# Patient Record
Sex: MALE | Race: WHITE | NOT HISPANIC OR LATINO | Employment: FULL TIME | URBAN - METROPOLITAN AREA
[De-identification: names, ages, dates, MRNs, and addresses within clinical notes are randomized per-mention and may not be internally consistent; named-entity substitution may affect disease eponyms.]

---

## 2019-11-29 ENCOUNTER — CLINICAL SUPPORT (OUTPATIENT)
Dept: GASTROENTEROLOGY | Facility: CLINIC | Age: 59
End: 2019-11-29

## 2019-11-29 VITALS — HEIGHT: 71 IN | WEIGHT: 221 LBS | BODY MASS INDEX: 30.94 KG/M2

## 2019-11-29 DIAGNOSIS — Z12.11 ENCOUNTER FOR SCREENING COLONOSCOPY: Primary | ICD-10-CM

## 2019-11-29 RX ORDER — LISINOPRIL 10 MG/1
10 TABLET ORAL DAILY
COMMUNITY

## 2019-11-29 NOTE — PROGRESS NOTES
Pt here for colon prep  Dx: screening  Rx sent to provider for signature  Instructions for Plenvu given  Meds reviewed  Screening form signed

## 2025-02-13 NOTE — PROGRESS NOTES
Patient ID: Damien Bello is a 64 y.o. male Date of Birth 1960       Chief Complaint   Patient presents with    New Patient Visit           Heel Pain     Left                Diagnosis:  1. Plantar fasciitis, left  -     bupivacaine (MARCAINE) 0.5 % injection 0.5 mL  -     dexamethasone (DECADRON) injection 4 mg  -     triamcinolone acetonide (KENALOG-40) 40 mg/mL injection 40 mg  -     Ambulatory Referral to Physical Therapy; Future  -     Device Prior Authorization; Future    Initial pedal evaluation with socks and shoes removed.  Discussed biomechanics, etiology and treatment options for heel pain.   Patient given stretching exercises to do at home, ice, no barefoot.  Physical therapy was ordered.  Injection administered, please see procedure note.  Patient is good feet store orthotics were evaluated, they do not provide an appropriate biomechanical support especially in the rear foot, we will preop custom orthotics, he is aware there will be a $350 charge if they are not covered, he is willing to proceed, patient is scheduled for casting appointment.  We discussed shoe gear modification, he will go to [employer] for new shoes.  Patient understands and agrees with plan will follow-up in 6 to 8 weeks.      Foot/lower extremity injection    Performed by: Ellyn Almeida DPM  Authorized by: Ellyn Almeida DPM    Procedure:     Other Assisting Provider: No      Verbal consent obtained?: Yes      Risks and benefits: Risks, benefits and alternatives were discussed      Consent given by:  Patient    Time out: Immediately prior to the procedure a time out was called      Patient states understanding of procedure being performed: Yes      Patient identity confirmed:  Verbally with patient    Supporting Documentation:     Indications:  Pain    Procedure Details:    Prep: patient was prepped and draped in usual sterile fashion                Ethyl Chloride was applied      Needle size: 25 G G     Ultrasound Guidance: no      Approach:  Medial    Laterality:  Left    Location: aponeurosis      Aponeurosis Structures: Plantar fascia origin      Comments:      1:1:1 mixture of 0.5 cc each of 0.5% plain Marcaine, Decadron 4 mg/mL and Kenalog 40 was injected to medial left heel       Subjective:   Obdulio presents today for evaluation and care of left heel pain, he states he has always had foot issues, he had special shoes when he was younger, when he lived in [place] he got a pair of custom orthotics which lasted him about 30 years, subsequently [place] and has been doing the routine and been doing okay and then last year when he went to [place] when he was not wearing the orthotics he started with heel pain, now pain at worst is 7/10.        The following portions of the patient's history were reviewed and updated as appropriate:     Past Medical History:   Diagnosis Date    Hypertension 2022    Plantar fasciitis 1991       Past Surgical History:   Procedure Laterality Date    KNEE SURGERY  2008    Left knee, torn meniscus repair    TONSILLECTOMY  1964       Social History     Socioeconomic History    Marital status: /Civil Union     Spouse name: None    Number of children: None    Years of education: None    Highest education level: None   Occupational History    None   Tobacco Use    Smoking status: Never    Smokeless tobacco: Never    Tobacco comments:     Can't quit what you never started  ;-)   Substance and Sexual Activity    Alcohol use: Not Currently    Drug use: Never    Sexual activity: Yes     Partners: Female     Birth control/protection: None   Other Topics Concern    None   Social History Narrative    None     Social Drivers of Health     Financial Resource Strain: Not At Risk (12/12/2024)    Received from [facility]    Financial Resource Strain     In the last 12 months did you skip medications to save money?: No     In the  last 12 months, was there a time when you needed to see a doctor but could not because of cost?: No   Food Insecurity: Not At Risk (12/12/2024)    Received from [facility]    Food Insecurity     In the last 12 months did you ever eat less than you felt you should because there wasn't enough money for food?: No   Transportation Needs: Not At Risk (12/12/2024)    Received from [facility]    Transporation     In the last 12 months, have you ever had to go without healthcare because you didn't have a way to get there?: No   Physical Activity: Sufficiently Active (12/12/2024)    Received from [facility]    Exercise Vital Sign     Days of Exercise per Week: 7 days     Minutes of Exercise per Session: 70 min   Stress: No Stress Concern Present (12/12/2024)    Received from [facility]    [employer] - Occupational Stress Questionnaire     Feeling of Stress : Not at all   Social Connections: Not At Risk (12/12/2024)    Received from [facility]    Social Connections     Do you often feel lonely?: No   Intimate Partner Violence: Not on file   Housing Stability: Not At Risk (12/12/2024)    Received from [facility]    Housing Stability     Are you worried that in the next 2 months you may not have stable housing?: No          Current Outpatient Medications:     lisinopril (ZESTRIL) 10 mg tablet, Take 10 mg by mouth daily, Disp: , Rfl:     PEG-KCl-NaCl-NaSulf-Na Asc-C (PLENVU) 140 g SOLR, As directed BIN:[id number] GROUP:[id number] PCN:CNRX ID:[id number] 1 KIT, Disp: 1 each, Rfl: 0    rosuvastatin (CRESTOR) 5 mg tablet, Take 5 mg by mouth daily, Disp: , Rfl:   No current facility-administered medications for this visit.    Allergies  Patient has no known allergies.    Family History   Problem Relation Age of Onset    Arthritis Mother     Heart disease  "Mother     Hypertension Mother     Arthritis Father     Heart disease Father     Hypertension Father            Objective:  Ht 5' 11\" (1.803 m) Comment: verbal  Wt 100 kg (221 lb)   BMI 30.82 kg/m²     Review of Systems   Constitutional:  Negative for chills and fever.   HENT:  Negative for ear pain and sore throat.    Eyes:  Negative for pain and visual disturbance.   Respiratory:  Negative for cough and shortness of breath.    Cardiovascular:  Negative for chest pain and palpitations.   Gastrointestinal:  Negative for abdominal pain and vomiting.   Genitourinary:  Negative for dysuria and hematuria.   Musculoskeletal:  Negative for arthralgias and back pain.        Left heel pain   Skin:  Negative for color change and rash.   Neurological:  Negative for seizures and syncope.   All other systems reviewed and are negative.      Physical Exam  Constitutional:       Appearance: Normal appearance. He is normal weight.   HENT:      Head: Normocephalic and atraumatic.      Right Ear: Tympanic membrane, ear canal and external ear normal.      Left Ear: External ear normal.      Nose: Nose normal.      Mouth/Throat:      Mouth: Mucous membranes are moist.      Pharynx: Oropharynx is clear.   Eyes:      Conjunctiva/sclera: Conjunctivae normal.      Pupils: Pupils are equal, round, and reactive to light.   Cardiovascular:      Pulses: Normal pulses.           Dorsalis pedis pulses are 2+ on the right side and 2+ on the left side.        Posterior tibial pulses are 2+ on the right side and 2+ on the left side.   Pulmonary:      Effort: Pulmonary effort is normal.   Musculoskeletal:      Cervical back: Normal range of motion.      Right lower leg: No edema.      Left lower leg: No edema.   Feet:      Right foot:      Protective Sensation: 10 sites tested.  10 sites sensed.      Skin integrity: Skin integrity normal.      Toenail Condition: Right toenails are normal.      Left foot:      Protective Sensation: 10 sites tested. " " 10 sites sensed.      Skin integrity: Skin integrity normal.      Toenail Condition: Left toenails are normal.      Comments: Left heel with pain on palpation medial calcaneal tubercle, flexible pes cavus foot type with pronation ambulation, limited inversion and STJ, no signs of tarsal tunnel, equina varus deformity.  Skin:     General: Skin is warm and dry.      Capillary Refill: Capillary refill takes less than 2 seconds.   Neurological:      General: No focal deficit present.      Mental Status: He is alert and oriented to person, place, and time. Mental status is at baseline.   Psychiatric:         Mood and Affect: Mood normal.         Behavior: Behavior normal.         Thought Content: Thought content normal.         Judgment: Judgment normal.                No pertinent results found.      Ellyn Almeida DPM, DPDENIS, FACFAS    Portions of the record may have been created with voice recognition software. Occasional wrong word or \"sound a like\" substitutions may have occurred due to the inherent limitations of voice recognition software. Read the chart carefully and recognize, using context, where substitutions have occurred.  "

## 2025-02-14 ENCOUNTER — OFFICE VISIT (OUTPATIENT)
Dept: PODIATRY | Facility: CLINIC | Age: 65
End: 2025-02-14
Payer: COMMERCIAL

## 2025-02-14 VITALS — HEIGHT: 71 IN | BODY MASS INDEX: 30.94 KG/M2 | WEIGHT: 221 LBS

## 2025-02-14 DIAGNOSIS — M72.2 PLANTAR FASCIITIS, LEFT: Primary | ICD-10-CM

## 2025-02-14 PROCEDURE — 99203 OFFICE O/P NEW LOW 30 MIN: CPT | Performed by: PODIATRIST

## 2025-02-14 PROCEDURE — 20550 NJX 1 TENDON SHEATH/LIGAMENT: CPT | Performed by: PODIATRIST

## 2025-02-14 RX ORDER — ROSUVASTATIN CALCIUM 5 MG/1
5 TABLET, COATED ORAL DAILY
COMMUNITY

## 2025-02-14 RX ORDER — DEXAMETHASONE SODIUM PHOSPHATE 4 MG/ML
4 INJECTION, SOLUTION INTRA-ARTICULAR; INTRALESIONAL; INTRAMUSCULAR; INTRAVENOUS; SOFT TISSUE ONCE
Status: COMPLETED | OUTPATIENT
Start: 2025-02-14 | End: 2025-02-14

## 2025-02-14 RX ORDER — TRIAMCINOLONE ACETONIDE 40 MG/ML
40 INJECTION, SUSPENSION INTRA-ARTICULAR; INTRAMUSCULAR ONCE
Status: COMPLETED | OUTPATIENT
Start: 2025-02-14 | End: 2025-02-14

## 2025-02-14 RX ORDER — BUPIVACAINE HYDROCHLORIDE 5 MG/ML
0.5 INJECTION, SOLUTION PERINEURAL ONCE
Status: COMPLETED | OUTPATIENT
Start: 2025-02-14 | End: 2025-02-14

## 2025-02-14 RX ADMIN — TRIAMCINOLONE ACETONIDE 40 MG: 40 INJECTION, SUSPENSION INTRA-ARTICULAR; INTRAMUSCULAR at 08:59

## 2025-02-14 RX ADMIN — BUPIVACAINE HYDROCHLORIDE 0.5 ML: 5 INJECTION, SOLUTION PERINEURAL at 08:58

## 2025-02-14 RX ADMIN — DEXAMETHASONE SODIUM PHOSPHATE 4 MG: 4 INJECTION, SOLUTION INTRA-ARTICULAR; INTRALESIONAL; INTRAMUSCULAR; INTRAVENOUS; SOFT TISSUE at 08:59

## 2025-02-21 ENCOUNTER — EVALUATION (OUTPATIENT)
Dept: PHYSICAL THERAPY | Facility: CLINIC | Age: 65
End: 2025-02-21
Payer: COMMERCIAL

## 2025-02-21 DIAGNOSIS — M72.2 PLANTAR FASCIITIS, LEFT: Primary | ICD-10-CM

## 2025-02-21 PROCEDURE — 97161 PT EVAL LOW COMPLEX 20 MIN: CPT

## 2025-02-21 PROCEDURE — 97110 THERAPEUTIC EXERCISES: CPT

## 2025-02-21 NOTE — PROGRESS NOTES
PT Evaluation     Today's date: 2025  Patient name: Damien Bello  : 1960  MRN: [id number]  Referring provider: Ellyn Almeida DPM  Dx:   Encounter Diagnosis     ICD-10-CM    1. Plantar fasciitis, left  M72.2 Ambulatory Referral to Physical Therapy          Start Time: 0815  Stop Time: 0900  Total time in clinic (min): 45 minutes    Assessment  Impairments: abnormal gait, abnormal or restricted ROM, activity intolerance, lacks appropriate home exercise program, pain with function and weight-bearing intolerance  Symptom irritability: low    Assessment details: Damien Bello is a pleasant 64 y.o. male who presents with chronic L heel pain.  Movement diagnosis of L ankle hypomobility with primary nociceptive pain.  he has limited L ankle DF ROM, hypomobility of talocrural and subtalar joints, and activity intolerance resulting in the pain he is experiencing and fear of not being able to keep active.  No further referral appears necessary at this time based upon examination results.  I expect he will improve with ROM exercises and manual therapy.  Positive prognostic indicators include good understanding of diagnosis and treatment plan options.  Negative prognostic indicators include chronicity of symptoms, hypertension, and obesity.  Gave patient DF stretch with strap, WB DF MWM, and great toe stretch for HEP.  Patient would benefit from skilled PT services to address these deficits and return to PLOF.    Comparable signs:  1) L DF ROM  2) ambulation  Understanding of Dx/Px/POC: good     Prognosis: good    Goals  STGs: to be achieved in 3 weeks  Patient will be independent with HEP  Patient will decrease pain by 2 points  LTGs: to be achieved in 6 weeks  Patient will improve L DF ROM by 5*  Patient will be able to ambulate 1 mile without limitation  Patient will be able complete yard work without limitation    Plan  Patient would benefit from: skilled physical therapy    Planned therapy  interventions: home exercise program, therapeutic exercise, therapeutic activities, joint mobilization, manual therapy, motor coordination training, neuromuscular re-education, patient education, strengthening, activity modification, IASTM, kinesiology taping, massage, Rodriguez taping, patient/caregiver education, flexibility, stretching and functional ROM exercises    Frequency: 1x week  Duration in weeks: 6  Plan of Care beginning date: 2025  Plan of Care expiration date: 2025  Treatment plan discussed with: patient        Subjective Evaluation    History of Present Illness  Mechanism of injury: Pain location: L medial calcaneal tubercle and diffuse about plantar fascia  Pain severity: 0-0-2 (8 at worst prior to CSI)  Aggs: first steps in the morning, driving, walking after sitting for prolonged  Eases: loosening it up, moving  Irritability: low  KRYSTYNA/timeline: received CSI about a week ago which seems to have provided good relief, had been using off the shelf orthotics in shoes for about 30 years, about a year and a half ago he switched brands (screened at [facility])- seemed to be doing well for about 8 months until one day he walked without them resulting in heel pain, has had the heel pain ever since even with putting the orthotics back in  Red flags: none  PMH/PSH: may have had bout of plantar fasciitis several years ago  Patient Goals  Patient goal: walking, work around yard,   Pain  Current pain ratin  At best pain ratin  At worst pain ratin          Objective     Tenderness     Additional Tenderness Details  Medial calcaneal tubercle on L    Neurological Testing     Sensation     Lumbar   Left   Intact: light touch    Right   Intact: light touch    Active Range of Motion     Lumbar   Flexion:  WFL  Extension:  WFL  Left Ankle/Foot   Dorsiflexion (ke): -3 degrees   Plantar flexion: 63 degrees   Inversion: 50 degrees   Eversion: 12 degrees     Right Ankle/Foot    Dorsiflexion (ke): -1 degrees   Plantar flexion: 70 degrees   Inversion: 55 degrees   Eversion: 20 degrees     Additional Active Range of Motion Details  B side glide WNL  WB DF ROM  R=14 cm  L= 13 cm    Joint Play   Left Ankle/Foot  Hypomobile in the talocrural joint and subtalar joint.     Right Ankle/Foot  Hypomobile in the talocrural joint and subtalar joint.     Strength/Myotome Testing     Left Ankle/Foot   Dorsiflexion: 5  Inversion: 4+  Eversion: 5    Right Ankle/Foot   Dorsiflexion: 5  Inversion: 4+  Eversion: 5    Tests     Lumbar     Left   Negative sural bias, tibial bias and slump test.     Right   Negative sural bias, tibial bias and slump test.     Left Knee   Negative peroneal nerve tension.     Right Knee   Negative peroneal nerve tension.   Left Ankle/Foot   Negative for windlass.     Right Ankle/Foot   Negative for windlass.     Ambulation     Ambulation: Level Surfaces   Ambulation without assistive device: independent    Additional Level Surfaces Ambulation Details  Slight pronation B, too many toe sign B            Daily Treatment Diary     Precautions: HTN  FOTO Completed On: 2/21/25    POC Expires Reeval for Medicare to be completed  Unit Limit Auth Expiration Date PT/OT/STVisit Limit   4/4/25 By visit na 4 12/31/25 BOMN    Completed on visit                    Auth Status DATE 2/21        NA Visit # 1         Remaining         MANUAL THERAPY         AP talocrural mobs         Subtalar mobs         WB DF MWM         IASTM                           THERAPEUTIC EXERCISE HEP         Great toe ext stretch x HEP        DF stretch w/ strap x HEP        DF MWM w/ bands x HEP        Heel raises                                                                                          Pt edu  GS- HEP, anatomy        Bike          Treadmill          NEUROMUSCULAR REEDUCATION           SLS                                                                                                                                             THERAPEUTIC ACTIVITY          Step up/down          STS                              GAIT TRAINING                                                  MODALITIES

## 2025-02-28 ENCOUNTER — APPOINTMENT (OUTPATIENT)
Dept: PHYSICAL THERAPY | Facility: CLINIC | Age: 65
End: 2025-02-28
Payer: COMMERCIAL

## 2025-03-07 ENCOUNTER — OFFICE VISIT (OUTPATIENT)
Dept: PHYSICAL THERAPY | Facility: CLINIC | Age: 65
End: 2025-03-07
Payer: COMMERCIAL

## 2025-03-07 DIAGNOSIS — M72.2 PLANTAR FASCIITIS, LEFT: Primary | ICD-10-CM

## 2025-03-07 PROCEDURE — 97110 THERAPEUTIC EXERCISES: CPT

## 2025-03-07 PROCEDURE — 97140 MANUAL THERAPY 1/> REGIONS: CPT

## 2025-03-07 NOTE — PROGRESS NOTES
Daily Note     Today's date: 3/7/2025  Patient name: Damien Bello  : 1960  MRN: [id number]  Referring provider: Ellyn Almeida DPM  Dx:   Encounter Diagnosis     ICD-10-CM    1. Plantar fasciitis, left  M72.2           Start Time: 813  Stop Time: 844  Total time in clinic (min): 31 minutes    Subjective: Presents to therapy noting he feels his foot is getting better, as he is now able to drive longer than thirty minutes without an increase in symptoms. States he has been performing his home exercises, but not everyday.       Objective: See treatment diary below      Assessment: Patient program performed to tolerance secondary to first visit post evaluation. Reviewed all home exercises with minimal cueing secondary to consistency performing HEP. Patient denied increased symptoms with all interventions, including manual IASTM to left heel. Minimal left knee discomfort displayed with SLS on left lower extremity, but dissipated once placing weight on both feet; 1 finger tip hold utilized to stabilize. Patient demonstrated fatigue post treatment, exhibited good technique with therapeutic exercises, and would benefit from continued PT      Plan: Continue per plan of care.      Daily Treatment Diary     Precautions: HTN  FOTO Completed On: 25    POC Expires Reeval for Medicare to be completed  Unit Limit Auth Expiration Date PT/OT/STVisit Limit   25 By visit na 4 25 BOMN    Completed on visit                    Auth Status DATE 2/21 3/7       NA Visit # 1 2        Remaining         MANUAL THERAPY         AP talocrural mobs         Subtalar mobs         WB DF MWM         IASTM  8' JS                         THERAPEUTIC EXERCISE HEP         Great toe ext stretch x HEP 3x20''       DF stretch w/ strap x HEP 3x20''       DF MWM w/ bands x HEP 2x10       Heel raises   2x10                                                                                       Pt edu  GS- HEP, anatomy        Bike    L1 5'       Treadmill          NEUROMUSCULAR REEDUCATION           SLS   B/L 3x20''                                                                                                                                         THERAPEUTIC ACTIVITY          Step up/down          STS                              GAIT TRAINING                                                  MODALITIES

## 2025-03-14 ENCOUNTER — OFFICE VISIT (OUTPATIENT)
Dept: PHYSICAL THERAPY | Facility: CLINIC | Age: 65
End: 2025-03-14
Payer: COMMERCIAL

## 2025-03-14 DIAGNOSIS — M72.2 PLANTAR FASCIITIS, LEFT: Primary | ICD-10-CM

## 2025-03-14 PROCEDURE — 97110 THERAPEUTIC EXERCISES: CPT

## 2025-03-14 PROCEDURE — 97140 MANUAL THERAPY 1/> REGIONS: CPT

## 2025-03-14 NOTE — PROGRESS NOTES
"Daily Note     Today's date: 3/14/2025  Patient name: Damien Bello  : 1960  MRN: [id number]  Referring provider: Ellyn Almeida DPM  Dx:   Encounter Diagnosis     ICD-10-CM    1. Plantar fasciitis, left  M72.2           Start Time: 816  Stop Time: 0855  Total time in clinic (min): 39 minutes    Subjective: Patient reports that his foot has been feeling pretty good overall.  He states that it was sore last night and this morning as he spent a lot of time on his feet yesterday, including a 1.5 mile walk.      Objective: See treatment diary below  L DF= 10*      Assessment: Patient demonstrates improving L DF ROM this session.  Patient completed today's session without increase in pain.  Added great toe flexion with TB to strengthen flexor hallucis muscles for arch support.  IASTM performed in order to improve plantar fascia extensibility.  Future sessions should continue to progress manual therapy and DF ROM as able. Tolerated treatment well. Patient would benefit from continued PT.      Plan: Continue per plan of care.      Daily Treatment Diary     Precautions: HTN  FOTO Completed On: 25    POC Expires Reeval for Medicare to be completed  Unit Limit Auth Expiration Date PT/OT/STVisit Limit   25 By visit na 4 25 BOMN    Completed on visit                    Auth Status DATE 2/21 3/7 3/14      NA Visit # 1 2 3       Remaining         MANUAL THERAPY         AP talocrural mobs   4' GS  Gr III      Subtalar mobs   4' GS  Gr III      WB DF MWM   2x10      IASTM  8' JS 8' GS                        THERAPEUTIC EXERCISE HEP         Great toe ext stretch x HEP 3x20'' 4x30\"      DF stretch w/ strap x HEP 3x20''       DF MWM w/ bands x HEP 2x10       Heel raises   2x10       TB great toe flex x   2x10  btb                                                                            Pt edu  GS- HEP, anatomy  GS- HEP      Bike   L1 5' dis No bike     Treadmill     Use treadmill NV     NEUROMUSCULAR " REEDUCATION           [facility]   B/L 3x20''                                                                                                                                         THERAPEUTIC ACTIVITY          Step up/down          STS                              GAIT TRAINING                                                  MODALITIES

## 2025-03-21 ENCOUNTER — OFFICE VISIT (OUTPATIENT)
Dept: PHYSICAL THERAPY | Facility: CLINIC | Age: 65
End: 2025-03-21
Payer: COMMERCIAL

## 2025-03-21 DIAGNOSIS — M72.2 PLANTAR FASCIITIS, LEFT: Primary | ICD-10-CM

## 2025-03-21 PROCEDURE — 97140 MANUAL THERAPY 1/> REGIONS: CPT

## 2025-03-21 PROCEDURE — 97112 NEUROMUSCULAR REEDUCATION: CPT

## 2025-03-21 PROCEDURE — 97110 THERAPEUTIC EXERCISES: CPT

## 2025-03-21 NOTE — PROGRESS NOTES
"Daily Note     Today's date: 3/21/2025  Patient name: Damien Bello  : 1960  MRN: [id number]  Referring provider: Ellyn Almeida DPM  Dx:   Encounter Diagnosis     ICD-10-CM    1. Plantar fasciitis, left  M72.2           Start Time: 817  Stop Time: 925  Total time in clinic (min): 68 minutes    Subjective: Pt reports he is feeling great today - has no pain noted this morning.       Objective: See treatment diary below      Assessment: Tolerated treatment well. Progressed pt with new ex's as indicated below, focusing on static and dynamic balance as well as stepping/up down on unlevel surfaces e.g. bosu. Ended treatment with manuals, which pt tolerated well overall. Gave pt info re: [facility] for possible orthotic consultation. Pt making very good progress overall with decreased pain levels overall. Patient demonstrated fatigue post treatment, exhibited good technique with therapeutic exercises, and would benefit from continued PT      Plan: Continue per plan of care.      Daily Treatment Diary     Precautions: HTN  FOTO Completed On: 25    POC Expires Reeval for Medicare to be completed  Unit Limit Auth Expiration Date PT/OT/STVisit Limit   25 By visit na 4 25 BOMN    Completed on visit                    Auth Status DATE 2/21 3/7 3/14 3/21     NA Visit # 1 2 3 4      Remaining         MANUAL THERAPY         AP talocrural mobs   4' GS  Gr III NA Gr III     Subtalar mobs   4' GS  Gr III NA Gr III     WB DF MWM   2x10      IASTM  8' JS 8' GS NA                        THERAPEUTIC EXERCISE HEP         Great toe ext stretch x HEP 3x20'' 4x30\" 4x30\"      DF stretch w/ strap x HEP 3x20''  3x20\"      DF MWM w/ bands x HEP 2x10       Heel raises   2x10  2x10 with ecc lowering     TB great toe flex x   2x10  btb 2x10 btb     Slantboard stretch - gastra + soleous     3x30\" ea                                                                 Pt edu  GS- HEP, anatomy  GS- HEP    " "  Bike   L1 5' dis No bike     Treadmill     6'      NEUROMUSCULAR REEDUCATION           SLS   B/L 3x20''  B/L 3x30\" ea      Heel/toe tandem stance     3x30\" ea dir      Tandem walking on airex beam     X3 laps ea fwd + retro      Sidestepping on airex beam     X3 laps                                                                                                          THERAPEUTIC ACTIVITY          Step up/down     On bosu 10x ea side fwd + lat      STS                              GAIT TRAINING                                                  MODALITIES                                            "

## 2025-03-28 ENCOUNTER — APPOINTMENT (OUTPATIENT)
Dept: PHYSICAL THERAPY | Facility: CLINIC | Age: 65
End: 2025-03-28
Payer: COMMERCIAL

## 2025-04-04 ENCOUNTER — EVALUATION (OUTPATIENT)
Dept: PHYSICAL THERAPY | Facility: CLINIC | Age: 65
End: 2025-04-04
Payer: COMMERCIAL

## 2025-04-04 ENCOUNTER — OFFICE VISIT (OUTPATIENT)
Dept: PODIATRY | Facility: CLINIC | Age: 65
End: 2025-04-04

## 2025-04-04 VITALS — BODY MASS INDEX: 29.12 KG/M2 | WEIGHT: 208 LBS | HEIGHT: 71 IN

## 2025-04-04 DIAGNOSIS — M72.2 PLANTAR FASCIITIS, LEFT: Primary | ICD-10-CM

## 2025-04-04 PROCEDURE — 97110 THERAPEUTIC EXERCISES: CPT

## 2025-04-04 PROCEDURE — 97140 MANUAL THERAPY 1/> REGIONS: CPT

## 2025-04-04 NOTE — PROGRESS NOTES
PT Discharge    Today's date: 2025  Patient name: Damien Bello  : 1960  MRN: [id number]  Referring provider: Ellyn Almeida DPM  Dx:   Encounter Diagnosis     ICD-10-CM    1. Plantar fasciitis, left  M72.2           Start Time: 814  Stop Time: 846  Total time in clinic (min): 32 minutes    Assessment    Assessment details: Damien Bello is a pleasant 64 y.o. male who presents with chronic L heel pain.  Movement diagnosis of L ankle hypomobility with primary nociceptive pain.  With PT, he has improved his L ankle DF ROM and subtalar mobility.  Functionally, he has been able to return to working in the yard and ambulate >1 mile.  We discussed that he would still benefit from consultation for custom orthotics.  Patient should be discharged to [facility] for maintenance at this time.  Encouraged patient to contact me with any questions or concerns in the future.    Goals  STGs: to be achieved in 3 weeks  Patient will be independent with HEP -met  Patient will decrease pain by 2 points -not met  LTGs: to be achieved in 6 weeks  Patient will improve L DF ROM by 5* -met  Patient will be able to ambulate 1 mile without limitation -partially met  Patient will be able complete yard work without limitation -met    Plan    Treatment plan discussed with: patient  Plan details: D/c to HEP        Subjective Evaluation    History of Present Illness  Mechanism of injury: Pain location: L medial calcaneal tubercle and diffuse about plantar fascia  Pain severity: 0-0-2 (8 at worst prior to CSI)  Aggs: first steps in the morning, driving, walking after sitting for prolonged  Eases: loosening it up, moving  Irritability: low  KRYSTYNA/timeline: received CSI about a week ago which seems to have provided good relief, had been using off the shelf orthotics in shoes for about 30 years, about a year and a half ago he switched brands (screened at [facility])- seemed to be doing well for about 8 months until one day he walked  without them resulting in heel pain, has had the heel pain ever since even with putting the orthotics back in  Red flags: none  PMH/PSH: may have had bout of plantar fasciitis several years ago    /- patient reports that he is about 90% better since starting PT.  He stats that he does have occasional pressure point pain but overall much better.  Patient Goals  Patient goal: walking, work around yard,   Pain  Current pain ratin  At best pain ratin  At worst pain rating: 3          Objective     Neurological Testing     Sensation     Lumbar   Left   Intact: light touch    Right   Intact: light touch    Active Range of Motion     Lumbar   Flexion:  WFL  Extension:  WFL  Left Ankle/Foot   Dorsiflexion (ke): 4 degrees   Plantar flexion: 63 degrees   Inversion: 50 degrees   Eversion: 25 degrees     Right Ankle/Foot   Dorsiflexion (ke): -1 degrees   Plantar flexion: 70 degrees   Inversion: 55 degrees   Eversion: 20 degrees     Additional Active Range of Motion Details  B side glide WNL  WB DF ROM  R=14 cm  L= 13 cm    Joint Play   Left Ankle/Foot  Joints within functional limits are the subtalar joint. Hypomobile in the talocrural joint.     Right Ankle/Foot  Joints within functional limits are the subtalar joint. Hypomobile in the talocrural joint.     Strength/Myotome Testing     Left Ankle/Foot   Dorsiflexion: 5  Inversion: 5  Eversion: 5    Right Ankle/Foot   Dorsiflexion: 5  Inversion: 5  Eversion: 5    Tests     Lumbar     Left   Negative sural bias, tibial bias and slump test.     Right   Negative sural bias, tibial bias and slump test.     Left Knee   Negative peroneal nerve tension.     Right Knee   Negative peroneal nerve tension.   Left Ankle/Foot   Negative for windlass.     Right Ankle/Foot   Negative for windlass.     Ambulation     Ambulation: Level Surfaces   Ambulation without assistive device: independent    Additional Level Surfaces Ambulation Details  Slight pronation B,  "too many toe sign B            Daily Treatment Diary     Precautions: HTN  FOTO Completed On: 2/21/25    POC Expires Reeval for Medicare to be completed  Unit Limit Auth Expiration Date PT/OT/STVisit Limit   4/4/25 By visit na 4 12/31/25 BOMN    Completed on visit                    Auth Status DATE 2/21 3/7 3/14 3/21 4/4    NA Visit # 1 2 3 4 5     Remaining         MANUAL THERAPY         AP talocrural mobs   4' GS  Gr III NA Gr III GS  Gr III  4'    Subtalar mobs   4' GS  Gr III NA Gr III GS  Gr III  4'    WB DF MWM   2x10      IASTM  8' JS 8' GS NA  GS  8'                      THERAPEUTIC EXERCISE HEP         Great toe ext stretch x HEP 3x20'' 4x30\" 4x30\"      DF stretch w/ strap x HEP 3x20''  3x20\"      DF MWM w/ bands x HEP 2x10       Heel raises   2x10  2x10 with ecc lowering     TB great toe flex x   2x10  btb 2x10 btb     Slantboard stretch - gastra + soleous     3x30\" ea                                                       PT re eval      GS    Pt edu  GS- HEP, anatomy  GS- HEP      Bike   L1 5' dis No bike     Treadmill     6'      NEUROMUSCULAR REEDUCATION           SLS   B/L 3x20''  B/L 3x30\" ea      Heel/toe tandem stance     3x30\" ea dir      Tandem walking on airex beam     X3 laps ea fwd + retro      Sidestepping on airex beam     X3 laps                                                                                                          THERAPEUTIC ACTIVITY          Step up/down     On bosu 10x ea side fwd + lat      STS                              GAIT TRAINING                                                  MODALITIES                                         "

## 2025-04-04 NOTE — PROGRESS NOTES
Patient ID: Damien Bello is a 64 y.o. male Date of Birth 1960       Chief Complaint   Patient presents with    casting     casting             Diagnosis:  1. Plantar fasciitis, left    Bilateral pedal evaluation with socks and shoes removed.  I personally reviewed patient's medical records and PT notes.  Biomechanical and gait examination was performed, plaster of rashel casting for custom orthotics was performed today with forefoot partially loaded and subtalar joint neutral.  Prescription was filled out for custom orthotic order for [facility].  Today I reviewed biomechanics, etiology and treatment options of plantar fasciitis.  Patient is to complete all formal physical therapy, continue home exercise program, shoe gear modifications.  As he has had significant improvement we have deferred any further injections.  Patient to stop wearing his inserts/orthotics from [facility], he was transition to power step inserts which were dispensed and he purchased from our office today.  Patient understands and agrees with plan, he will follow-up once his custom orthotics have been received from the lab.      Procedures     Subjective:   4/4/25 Tad presents today for follow-up evaluation and care of left foot plantar fasciitis, he states since receiving injection on 2/14/2024 going to physical therapy he is doing great.  He was discharged from PT today.    2/14/25 Sapphire Mak presents today for evaluation and care of left heel pain, he states he has always had foot issues, he had special shoes when he was younger, when he lived in [place] he got a pair of custom orthotics which lasted him about 30 years, subsequently went to [facility] about a year ago and has been doing the routine and been doing okay and then last year when he went to [place] in a race when he was not wearing the orthotics he started with heel pain, now pain at worst is 7/10.             The following portions of the patient's  "history were reviewed and updated as appropriate: allergies, current medications, past family history, past medical history, past social history, past surgical history, and problem list.        Objective:  Ht 5' 11\" (1.803 m)   Wt 94.3 kg (208 lb)   BMI 29.01 kg/m²     Review of Systems   Constitutional:  Negative for chills and fever.   HENT:  Negative for ear pain and sore throat.    Eyes:  Negative for pain and visual disturbance.   Respiratory:  Negative for cough and shortness of breath.    Cardiovascular:  Negative for chest pain and palpitations.   Gastrointestinal:  Negative for abdominal pain and vomiting.   Genitourinary:  Negative for dysuria and hematuria.   Musculoskeletal:  Negative for arthralgias and back pain.        Left plantar fasciitis   Skin:  Negative for color change and rash.   Neurological:  Negative for seizures and syncope.   All other systems reviewed and are negative.      Physical Exam  Constitutional:       Appearance: Normal appearance.   HENT:      Head: Normocephalic and atraumatic.      Right Ear: External ear normal.      Left Ear: External ear normal.      Nose: Nose normal.      Mouth/Throat:      Mouth: Mucous membranes are moist.      Pharynx: Oropharynx is clear.   Eyes:      Conjunctiva/sclera: Conjunctivae normal.      Pupils: Pupils are equal, round, and reactive to light.   Cardiovascular:      Pulses: Normal pulses.           Dorsalis pedis pulses are 2+ on the right side and 2+ on the left side.        Posterior tibial pulses are 2+ on the right side and 2+ on the left side.   Pulmonary:      Effort: Pulmonary effort is normal.   Musculoskeletal:      Cervical back: Normal range of motion.      Right lower leg: No edema.      Left lower leg: No edema.   Feet:      Right foot:      Protective Sensation: 10 sites tested.  10 sites sensed.      Skin integrity: Skin integrity normal.      Toenail Condition: Right toenails are normal.      Left foot:      Protective " "Sensation: 10 sites tested.  10 sites sensed.      Skin integrity: Skin integrity normal.      Toenail Condition: Left toenails are normal.      Comments: Left heel with no  pain on palpation medial calcaneal tubercle, flexible pes cavus foot type with pronation ambulation, limited inversion and STJ, no signs of tarsal tunnel, equina varus deformity  Skin:     General: Skin is warm and dry.      Capillary Refill: Capillary refill takes less than 2 seconds.   Neurological:      General: No focal deficit present.      Mental Status: He is alert and oriented to person, place, and time. Mental status is at baseline.   Psychiatric:         Mood and Affect: Mood normal.         Behavior: Behavior normal.         Thought Content: Thought content normal.         Judgment: Judgment normal.                  No pertinent results found.      Ellyn Almeida DPM, DPM, FACFAS    Portions of the record may have been created with voice recognition software. Occasional wrong word or \"sound a like\" substitutions may have occurred due to the inherent limitations of voice recognition software. Read the chart carefully and recognize, using context, where substitutions have occurred.  "

## 2025-04-21 ENCOUNTER — TELEPHONE (OUTPATIENT)
Dept: PODIATRY | Facility: CLINIC | Age: 65
End: 2025-04-21

## 2025-04-21 NOTE — TELEPHONE ENCOUNTER
Caller: Obdulio    Doctor: Dr. Almeida    Reason for call: returning call to schedule orthotics      Call back#: [phone]

## 2025-04-29 NOTE — PROGRESS NOTES
Patient ID: Damien Bello is a 64 y.o. male Date of Birth 1960       Chief Complaint   Patient presents with   • Foot Orthotics             Diagnosis:  1. Plantar fasciitis, left  -     bupivacaine (MARCAINE) 0.5 % injection 0.5 mL  -     triamcinolone acetonide (KENALOG-40) 40 mg/mL injection 40 mg  -     Foot/lower extremity injection  -     dexamethasone (DECADRON) injection 4 mg  2. Pes cavus of both feet    Bilateral pedal evaluation with socks and shoes removed.  I personally reviewed patient's medical records and PT notes.  Biomechanical and gait examination was performed, with and without custom orthotics.  Patient was given break-in instructions in writing for orthotic use.  Today I reviewed biomechanics, etiology and treatment options of plantar fasciitis.  Continue home exercise program  As patient is still having periodic discomfort and pain up to 5/10, injection was performed, please see procedure note.  Patient understands and agrees with plan, he will follow-up 4 weeks.    Foot/lower extremity injection    Performed by: Ellyn Almeida DPM  Authorized by: Ellyn Almeida DPM    Procedure:     Other Assisting Provider: No      Verbal consent obtained?: Yes      Risks and benefits: Risks, benefits and alternatives were discussed      Consent given by:  Patient    Time out: Immediately prior to the procedure a time out was called      Patient states understanding of procedure being performed: Yes      Patient identity confirmed:  Verbally with patient    Supporting Documentation:     Indications:  Pain    Procedure Details:    Prep: patient was prepped and draped in usual sterile fashion                Ethyl Chloride was applied      Needle size: 25 G G    Ultrasound Guidance: no      Approach:  Medial    Laterality:  Left    Location: aponeurosis      Aponeurosis Structures: Plantar fascia origin      Injection Information:       Patient tolerance:  Patient tolerated the procedure well with no  "immediate complications    Dressing: sterile dressing applied      Comments:      1:1:1 mix of 0.5 cc each 0.5% plain Marcaine, Decadron 4 mg/mL and Kenalog 40 was injected to medial left heel       Subjective:   5/1/25 Tad presents today for follow-up evaluation care of left foot plantar fasciitis and to  custom orthotics.  He states since discharge from PT and our last visit he continues to do very well.  He has stopped wearing his inserts from [facility] and transition to power step, he states the injection on 2/14 helped, he is having post static dyskinesia, pain is 5/10 at its worst.    4/4/25 Tad presents today for follow-up evaluation and care of left foot plantar fasciitis, he states since receiving injection on 2/14/2024 going to physical therapy he is doing great.  He was discharged from PT today.    2/14/25 Sapphire Mak presents today for evaluation and care of left heel pain, he states he has always had foot issues, he had special shoes when he was younger, when he lived in [place] he got a pair of custom orthotics which lasted him about 30 years, subsequently went to the [facility] about a year ago and has been doing the routine and been doing okay and then last year when he went to [place] in a race when he was not wearing the orthotics he started with heel pain, now pain at worst is 7/10.           The following portions of the patient's history were reviewed and updated as appropriate: allergies, current medications, past family history, past medical history, past social history, past surgical history, and problem list.        Objective:  Ht 5' 11\" (1.803 m) Comment: verbal  Wt 94.3 kg (208 lb)   BMI 29.01 kg/m²     Review of Systems   Constitutional:  Negative for chills and fever.   HENT:  Negative for ear pain and sore throat.    Eyes:  Negative for pain and visual disturbance.   Respiratory:  Negative for cough and shortness of breath.    Cardiovascular:  Negative for chest pain and " palpitations.   Gastrointestinal:  Negative for abdominal pain and vomiting.   Genitourinary:  Negative for dysuria and hematuria.   Musculoskeletal:  Negative for arthralgias and back pain.        Left plantar fasciitis   Skin:  Negative for color change and rash.   Neurological:  Negative for seizures and syncope.   All other systems reviewed and are negative.      Physical Exam  Constitutional:       Appearance: Normal appearance.   HENT:      Head: Normocephalic and atraumatic.      Right Ear: External ear normal.      Left Ear: External ear normal.      Nose: Nose normal.      Mouth/Throat:      Mouth: Mucous membranes are moist.      Pharynx: Oropharynx is clear.   Eyes:      Conjunctiva/sclera: Conjunctivae normal.      Pupils: Pupils are equal, round, and reactive to light.   Cardiovascular:      Pulses: Normal pulses.           Dorsalis pedis pulses are 2+ on the right side and 2+ on the left side.        Posterior tibial pulses are 2+ on the right side and 2+ on the left side.   Pulmonary:      Effort: Pulmonary effort is normal.   Musculoskeletal:      Cervical back: Normal range of motion.      Right lower leg: No edema.      Left lower leg: No edema.   Feet:      Right foot:      Protective Sensation: 10 sites tested.  10 sites sensed.      Skin integrity: Skin integrity normal.      Toenail Condition: Right toenails are normal.      Left foot:      Protective Sensation: 10 sites tested.  10 sites sensed.      Skin integrity: Skin integrity normal.      Toenail Condition: Left toenails are normal.      Comments: Left heel with no  pain on palpation medial calcaneal tubercle, flexible pes cavus foot type with pronation ambulation, limited inversion and STJ, no signs of tarsal tunnel, equina varus deformity  Skin:     General: Skin is warm and dry.      Capillary Refill: Capillary refill takes less than 2 seconds.   Neurological:      General: No focal deficit present.      Mental Status: He is alert and  "oriented to person, place, and time. Mental status is at baseline.   Psychiatric:         Mood and Affect: Mood normal.         Behavior: Behavior normal.         Thought Content: Thought content normal.         Judgment: Judgment normal.                No pertinent results found.      Ellyn Almeida DPM, SARI, FACJOSUE    Portions of the record may have been created with voice recognition software. Occasional wrong word or \"sound a like\" substitutions may have occurred due to the inherent limitations of voice recognition software. Read the chart carefully and recognize, using context, where substitutions have occurred.  "

## 2025-05-01 ENCOUNTER — OFFICE VISIT (OUTPATIENT)
Dept: PODIATRY | Facility: CLINIC | Age: 65
End: 2025-05-01
Payer: COMMERCIAL

## 2025-05-01 VITALS — WEIGHT: 208 LBS | BODY MASS INDEX: 29.12 KG/M2 | HEIGHT: 71 IN

## 2025-05-01 DIAGNOSIS — M72.2 PLANTAR FASCIITIS, LEFT: Primary | ICD-10-CM

## 2025-05-01 DIAGNOSIS — Q66.71 PES CAVUS OF BOTH FEET: ICD-10-CM

## 2025-05-01 DIAGNOSIS — Q66.72 PES CAVUS OF BOTH FEET: ICD-10-CM

## 2025-05-01 PROCEDURE — 99213 OFFICE O/P EST LOW 20 MIN: CPT | Performed by: PODIATRIST

## 2025-05-01 PROCEDURE — 20550 NJX 1 TENDON SHEATH/LIGAMENT: CPT | Performed by: PODIATRIST

## 2025-05-01 PROCEDURE — L3000 FT INSERT UCB BERKELEY SHELL: HCPCS | Performed by: PODIATRIST

## 2025-05-01 RX ORDER — DEXAMETHASONE SODIUM PHOSPHATE 4 MG/ML
4 INJECTION, SOLUTION INTRA-ARTICULAR; INTRALESIONAL; INTRAMUSCULAR; INTRAVENOUS; SOFT TISSUE ONCE
Status: COMPLETED | OUTPATIENT
Start: 2025-05-01 | End: 2025-05-01

## 2025-05-01 RX ORDER — BUPIVACAINE HYDROCHLORIDE 5 MG/ML
0.5 INJECTION, SOLUTION PERINEURAL ONCE
Status: COMPLETED | OUTPATIENT
Start: 2025-05-01 | End: 2025-05-01

## 2025-05-01 RX ORDER — DEXAMETHASONE SODIUM PHOSPHATE 4 MG/ML
4 INJECTION, SOLUTION INTRA-ARTICULAR; INTRALESIONAL; INTRAMUSCULAR; INTRAVENOUS; SOFT TISSUE EVERY 6 HOURS SCHEDULED
Status: DISCONTINUED | OUTPATIENT
Start: 2025-05-01 | End: 2025-05-01

## 2025-05-01 RX ORDER — TRIAMCINOLONE ACETONIDE 40 MG/ML
40 INJECTION, SUSPENSION INTRA-ARTICULAR; INTRAMUSCULAR ONCE
Status: COMPLETED | OUTPATIENT
Start: 2025-05-01 | End: 2025-05-01

## 2025-05-01 RX ORDER — DEXAMETHASONE SODIUM PHOSPHATE 4 MG/ML
4 INJECTION, SOLUTION INTRA-ARTICULAR; INTRALESIONAL; INTRAMUSCULAR; INTRAVENOUS; SOFT TISSUE ONCE
Status: DISCONTINUED | OUTPATIENT
Start: 2025-05-01 | End: 2025-05-01

## 2025-05-01 RX ADMIN — TRIAMCINOLONE ACETONIDE 40 MG: 40 INJECTION, SUSPENSION INTRA-ARTICULAR; INTRAMUSCULAR at 08:33

## 2025-05-01 RX ADMIN — DEXAMETHASONE SODIUM PHOSPHATE 4 MG: 4 INJECTION, SOLUTION INTRA-ARTICULAR; INTRALESIONAL; INTRAMUSCULAR; INTRAVENOUS; SOFT TISSUE at 08:42

## 2025-05-01 RX ADMIN — BUPIVACAINE HYDROCHLORIDE 0.5 ML: 5 INJECTION, SOLUTION PERINEURAL at 08:32

## 2025-06-05 ENCOUNTER — OFFICE VISIT (OUTPATIENT)
Dept: PODIATRY | Facility: CLINIC | Age: 65
End: 2025-06-05
Payer: COMMERCIAL

## 2025-06-05 VITALS — WEIGHT: 208 LBS | HEIGHT: 71 IN | BODY MASS INDEX: 29.12 KG/M2

## 2025-06-05 DIAGNOSIS — M72.2 PLANTAR FASCIITIS, LEFT: Primary | ICD-10-CM

## 2025-06-05 DIAGNOSIS — Q66.72 PES CAVUS OF BOTH FEET: ICD-10-CM

## 2025-06-05 DIAGNOSIS — Q66.71 PES CAVUS OF BOTH FEET: ICD-10-CM

## 2025-06-05 PROCEDURE — 99213 OFFICE O/P EST LOW 20 MIN: CPT | Performed by: PODIATRIST

## 2025-06-11 NOTE — PROGRESS NOTES
Patient ID: Damien Bello is a 64 y.o. male Date of Birth 1960       Chief Complaint   Patient presents with    Follow-up     Orthotics                Diagnosis:  1. Plantar fasciitis, left  2. Pes cavus of both feet    Bilateral pedal evaluation with socks and shoes removed.  I personally reviewed patient's medical records and PT notes.  Biomechanical and gait examination was performed, with and without custom orthotics.    Right orthotic is well adjusted, keep as is, we will send back to [facility] next week.  Left orthotic, patient feels he is supinating too much, pressure exactly where he was having it with the [facility] orthotics.  I added posting to rear foot with felt to see if this helps with combination.  I did order horseshoe pad for his heel and his device, if this accommodation with felt does not work we will send orthotic back for traditional heel cup with extra padding.    He will follow-up 1 week          Subjective:   6/10/25 Tad presents today for follow-up evaluation and care of right orthotic.  He was seen in our office last week at which time I increased his medial posting with felt to help alleviate some medial knee pain.  He states since then right is feeling much better, left after standing on his foot for multiple hours started to hurt exactly where his original heel pain was.  He removed the left static and went back to power step and feels that is better.    6/5/2025 -Obdulio presents today for follow-up evaluation and care of left foot plantar fasciitis.  He was seen in our office on 5/1/2025 at which time I dispensed him custom orthotics and performed an injection to his left heel.  He states his left foot and heel feel great, after wearing the orthotics for a little bit he developed right knee pain, transition back to power step over-the-counter inserts on the right and the pain is subsiding, now it is just on the inside of his knee.    5/1/25 Tad presents today for follow-up  "evaluation care of left foot plantar fasciitis and to  custom orthotics.  He states since discharge from PT and our last visit he continues to do very well.  He has stopped wearing his inserts from [facility] and transition to power step, he states the injection on 2/14 helped, he is having post static dyskinesia, pain is 5/10 at its worst.    4/4/25 Tad presents today for follow-up evaluation and care of left foot plantar fasciitis, he states since receiving injection on 2/14/2024 going to physical therapy he is doing great.  He was discharged from PT today.    2/14/25 Sapphire Mak presents today for evaluation and care of left heel pain, he states he has always had foot issues, he had special shoes when he was younger, when he lived in [place] he got a pair of custom orthotics which lasted him about 30 years, subsequently went to the [facility] about a year ago and has been doing the routine and been doing okay and then last year when he went to [place] in a race when he was not wearing the orthotics he started with heel pain, now pain at worst is 7/10.             The following portions of the patient's history were reviewed and updated as appropriate: allergies, current medications, past family history, past medical history, past social history, past surgical history, and problem list.        Objective:  Ht 5' 11\" (1.803 m) Comment: verbal  Wt 94.3 kg (208 lb)   BMI 29.01 kg/m²     Review of Systems   Constitutional:  Negative for chills and fever.   HENT:  Negative for ear pain and sore throat.    Eyes:  Negative for pain and visual disturbance.   Respiratory:  Negative for cough and shortness of breath.    Cardiovascular:  Negative for chest pain and palpitations.   Gastrointestinal:  Negative for abdominal pain and vomiting.   Genitourinary:  Negative for dysuria and hematuria.   Musculoskeletal:  Negative for arthralgias and back pain.        Left plantar fasciitis   Skin:  Negative for color " change and rash.   Neurological:  Negative for seizures and syncope.   All other systems reviewed and are negative.      Physical Exam  Constitutional:       Appearance: Normal appearance.   HENT:      Head: Normocephalic and atraumatic.      Right Ear: External ear normal.      Left Ear: External ear normal.      Nose: Nose normal.      Mouth/Throat:      Mouth: Mucous membranes are moist.      Pharynx: Oropharynx is clear.     Eyes:      Conjunctiva/sclera: Conjunctivae normal.      Pupils: Pupils are equal, round, and reactive to light.       Cardiovascular:      Pulses: Normal pulses.           Dorsalis pedis pulses are 2+ on the right side and 2+ on the left side.        Posterior tibial pulses are 2+ on the right side and 2+ on the left side.   Pulmonary:      Effort: Pulmonary effort is normal.     Musculoskeletal:      Cervical back: Normal range of motion.      Right lower leg: No edema.      Left lower leg: No edema.   Feet:      Right foot:      Protective Sensation: 10 sites tested.  10 sites sensed.      Skin integrity: Skin integrity normal.      Toenail Condition: Right toenails are normal.      Left foot:      Protective Sensation: 10 sites tested.  10 sites sensed.      Skin integrity: Skin integrity normal.      Toenail Condition: Left toenails are normal.      Comments: Left heel with  pain on palpation medial calcaneal tubercle, flexible pes cavus foot type with pronation ambulation, limited inversion and STJ, no signs of tarsal tunnel, equina varus deformity.   When ambulating with custom orthotic on the right, there is increased pronation likely the cause of his knee pain.    Skin:     General: Skin is warm and dry.      Capillary Refill: Capillary refill takes less than 2 seconds.     Neurological:      General: No focal deficit present.      Mental Status: He is alert and oriented to person, place, and time. Mental status is at baseline.     Psychiatric:         Mood and Affect: Mood normal.   "       Behavior: Behavior normal.         Thought Content: Thought content normal.         Judgment: Judgment normal.                  No pertinent results found.      Ellyn Almeida, SARI, DPDENIS, FACFAS    Portions of the record may have been created with voice recognition software. Occasional wrong word or \"sound a like\" substitutions may have occurred due to the inherent limitations of voice recognition software. Read the chart carefully and recognize, using context, where substitutions have occurred.  "

## 2025-06-12 ENCOUNTER — OFFICE VISIT (OUTPATIENT)
Dept: PODIATRY | Facility: CLINIC | Age: 65
End: 2025-06-12
Payer: COMMERCIAL

## 2025-06-12 VITALS — WEIGHT: 208 LBS | BODY MASS INDEX: 29.12 KG/M2 | HEIGHT: 71 IN

## 2025-06-12 DIAGNOSIS — Q66.71 PES CAVUS OF BOTH FEET: ICD-10-CM

## 2025-06-12 DIAGNOSIS — M72.2 PLANTAR FASCIITIS, LEFT: Primary | ICD-10-CM

## 2025-06-12 DIAGNOSIS — Q66.72 PES CAVUS OF BOTH FEET: ICD-10-CM

## 2025-06-12 PROCEDURE — 99213 OFFICE O/P EST LOW 20 MIN: CPT | Performed by: PODIATRIST

## 2025-06-18 NOTE — PROGRESS NOTES
Patient ID: Damien Bello is a 64 y.o. male Date of Birth 1960       Chief Complaint   Patient presents with    Follow-up     L Plantar fascitis              Diagnosis:  1. Plantar fasciitis, left  2. Pes cavus of both feet    Bilateral pedal evaluation with socks and shoes removed.  I personally reviewed patient's medical records and PT notes.  Biomechanical and gait examination was performed, with and without custom orthotics.    Orthotics sent back to [facility] to increase right medial and left lateral posting to match felt on current device.  Left posterior arch decreased height, areas marked.  In the meantime patient wears power step inserts.    He will follow-up after we receive orthotics, he will does not need an appointment, he may  with staff.          Subjective:   6/19/25 -Obdulio presents today for follow-up evaluation care of custom orthotics.  At last visit I increased lateral posting of left device, he states since then left is feeling much better.  Right continues to be good,     6/10/25 -Obdulio presents today for follow-up evaluation and care of right orthotic.  He was seen in our office last week at which time I increased his medial posting with felt to help alleviate some medial knee pain.  He states since then right is feeling much better, left after standing on his foot for multiple hours started to hurt exactly where his original heel pain was.  He removed the left static and went back to power step and feels that is better.    6/5/2025 -Obdulio presents today for follow-up evaluation and care of left foot plantar fasciitis.  He was seen in our office on 5/1/2025 at which time I dispensed him custom orthotics and performed an injection to his left heel.  He states his left foot and heel feel great, after wearing the orthotics for a little bit he developed right knee pain, transition back to power step over-the-counter inserts on the right and the pain is subsiding, now it is  "just on the inside of his knee.    5/1/25 Tad presents today for follow-up evaluation care of left foot plantar fasciitis and to  custom orthotics.  He states since discharge from PT and our last visit he continues to do very well.  He has stopped wearing his inserts from [employer] and transition to power step, he states the injection on 2/14 helped, he is having post static dyskinesia, pain is 5/10 at its worst.    4/4/25 Tad presents today for follow-up evaluation and care of left foot plantar fasciitis, he states since receiving injection on 2/14/2024 going to physical therapy he is doing great.  He was discharged from PT today.    2/14/25 Sapphire Mak presents today for evaluation and care of left heel pain, he states he has always had foot issues, he had special shoes when he was younger, when he lived in [place] he got a pair of custom orthotics which lasted him about 30 years, subsequently went to the [employer] about a year ago and has been doing the routine and been doing okay and then last year when he went to [place] a race when he was not wearing the orthotics he started with heel pain, now pain at worst is 7/10.             The following portions of the patient's history were reviewed and updated as appropriate: allergies, current medications, past family history, past medical history, past social history, past surgical history, and problem list.        Objective:  Ht 5' 11\" (1.803 m) Comment: verbal  Wt 94.3 kg (208 lb)   BMI 29.01 kg/m²     Review of Systems   Constitutional:  Negative for chills and fever.   HENT:  Negative for ear pain and sore throat.    Eyes:  Negative for pain and visual disturbance.   Respiratory:  Negative for cough and shortness of breath.    Cardiovascular:  Negative for chest pain and palpitations.   Gastrointestinal:  Negative for abdominal pain and vomiting.   Genitourinary:  Negative for dysuria and hematuria.   Musculoskeletal:  Negative for arthralgias " and back pain.        Left plantar fasciitis   Skin:  Negative for color change and rash.   Neurological:  Negative for seizures and syncope.   All other systems reviewed and are negative.      Physical Exam  Constitutional:       Appearance: Normal appearance.   HENT:      Head: Normocephalic and atraumatic.      Right Ear: External ear normal.      Left Ear: External ear normal.      Nose: Nose normal.      Mouth/Throat:      Mouth: Mucous membranes are moist.      Pharynx: Oropharynx is clear.     Eyes:      Conjunctiva/sclera: Conjunctivae normal.      Pupils: Pupils are equal, round, and reactive to light.       Cardiovascular:      Pulses: Normal pulses.           Dorsalis pedis pulses are 2+ on the right side and 2+ on the left side.        Posterior tibial pulses are 2+ on the right side and 2+ on the left side.   Pulmonary:      Effort: Pulmonary effort is normal.     Musculoskeletal:      Cervical back: Normal range of motion.      Right lower leg: No edema.      Left lower leg: No edema.   Feet:      Right foot:      Protective Sensation: 10 sites tested.  10 sites sensed.      Skin integrity: Skin integrity normal.      Toenail Condition: Right toenails are normal.      Left foot:      Protective Sensation: 10 sites tested.  10 sites sensed.      Skin integrity: Skin integrity normal.      Toenail Condition: Left toenails are normal.      Comments: Left heel with no   pain on palpation medial calcaneal tubercle, flexible pes cavus foot type with pronation ambulation, limited inversion and STJ, no signs of tarsal tunnel, equina varus deformity.   When ambulating with custom orthotic on the right, there is increased pronation likely the cause of his knee pain.    Skin:     General: Skin is warm and dry.      Capillary Refill: Capillary refill takes less than 2 seconds.     Neurological:      General: No focal deficit present.      Mental Status: He is alert and oriented to person, place, and time. Mental  "status is at baseline.     Psychiatric:         Mood and Affect: Mood normal.         Behavior: Behavior normal.         Thought Content: Thought content normal.         Judgment: Judgment normal.                  No pertinent results found.      Ellyn Almeida DPM, SARI, FACFAS    Portions of the record may have been created with voice recognition software. Occasional wrong word or \"sound a like\" substitutions may have occurred due to the inherent limitations of voice recognition software. Read the chart carefully and recognize, using context, where substitutions have occurred.  "

## 2025-06-19 ENCOUNTER — OFFICE VISIT (OUTPATIENT)
Dept: PODIATRY | Facility: CLINIC | Age: 65
End: 2025-06-19
Payer: COMMERCIAL

## 2025-06-19 VITALS — HEIGHT: 71 IN | WEIGHT: 208 LBS | BODY MASS INDEX: 29.12 KG/M2

## 2025-06-19 DIAGNOSIS — Q66.72 PES CAVUS OF BOTH FEET: ICD-10-CM

## 2025-06-19 DIAGNOSIS — Q66.71 PES CAVUS OF BOTH FEET: ICD-10-CM

## 2025-06-19 DIAGNOSIS — M72.2 PLANTAR FASCIITIS, LEFT: Primary | ICD-10-CM

## 2025-06-19 PROCEDURE — 99213 OFFICE O/P EST LOW 20 MIN: CPT | Performed by: PODIATRIST

## 2025-07-24 ENCOUNTER — OFFICE VISIT (OUTPATIENT)
Dept: PODIATRY | Facility: CLINIC | Age: 65
End: 2025-07-24
Payer: COMMERCIAL

## 2025-07-24 VITALS — BODY MASS INDEX: 29.12 KG/M2 | WEIGHT: 208 LBS | HEIGHT: 71 IN

## 2025-07-24 DIAGNOSIS — Q66.71 PES CAVUS OF BOTH FEET: ICD-10-CM

## 2025-07-24 DIAGNOSIS — Q66.72 PES CAVUS OF BOTH FEET: ICD-10-CM

## 2025-07-24 DIAGNOSIS — M72.2 PLANTAR FASCIITIS, LEFT: Primary | ICD-10-CM

## 2025-07-24 PROCEDURE — 99213 OFFICE O/P EST LOW 20 MIN: CPT | Performed by: PODIATRIST

## 2025-07-24 NOTE — PROGRESS NOTES
Name: Damien Bello      : 1960      MRN: [id number]  Encounter Provider: Ellyn Almeida DPM  Encounter Date: 2025   Encounter department: [facility] [place]  :  Assessment & Plan  Plantar fasciitis, left  Pes cavus of both feet    Bilateral pedal examination with socks and shoes removed.  Today we discussed the biomechanics, etiology and treatment options of plantar fasciitis, heel pain secondary to pes cavus foot type.  We discussed home exercises, he will continue with home exercise program from PT.  Strict no barefoot.  Modified custom orthotics were dispensed, they were fit to patient's shoes and to his feet, he was given instructions for break-in.  We discussed proper shoe gear, patient sneakers are about 5 months old, I explained to him anywhere between 6 months to a year he should update as these are the only shoes he wears and they will likely breakdown in the mid sole which we do not see.  Patient understands and agrees with the plan will follow-up in 3 to 4 weeks, if at that time he is fully comfortable in his orthotics and not having any difficulties he may most certainly cancel that appointment.           History of Present Illness   HPI  Damien Bello is a 64 y.o. male who presents today to  modified custom orthotics.  He states he is still having some fatigue with the over-the-counter inserts and is excited to get his custom ones back.    25 Tad presents today for follow-up evaluation care of custom orthotics.  At last visit I increased lateral posting of left device, he states since then left is feeling much better.  Right continues to be good,      6/10/25 Tad presents today for follow-up evaluation and care of right orthotic.  He was seen in our office last week at which time I increased his medial posting with felt to help alleviate some medial knee pain.  He states since then right is feeling much better, left after standing on his foot for  multiple hours started to hurt exactly where his original heel pain was.  He removed the left static and went back to power step and feels that is better.     6/5/2025 SapphireObdulio presents today for follow-up evaluation and care of left foot plantar fasciitis.  He was seen in our office on 5/1/2025 at which time I dispensed him custom orthotics and performed an injection to his left heel.  He states his left foot and heel feel great, after wearing the orthotics for a little bit he developed right knee pain, transition back to power step over-the-counter inserts on the right and the pain is subsiding, now it is just on the inside of his knee.     5/1/25 SapphireObdulio presents today for follow-up evaluation care of left foot plantar fasciitis and to  custom orthotics.  He states since discharge from PT and our last visit he continues to do very well.  He has stopped wearing his inserts from [employer] and transition to power step, he states the injection on 2/14 helped, he is having post static dyskinesia, pain is 5/10 at its worst.     4/4/25 Tad presents today for follow-up evaluation and care of left foot plantar fasciitis, he states since receiving injection on 2/14/2024 going to physical therapy he is doing great.  He was discharged from PT today.     2/14/25 Sapphire Mak presents today for evaluation and care of left heel pain, he states he has always had foot issues, he had special shoes when he was younger, when he lived in [place] he got a pair of custom orthotics which lasted him about 30 years, subsequently went to the [employer] about a year ago and has been doing the routine and been doing okay and then last year when he went to plug in a race when he was not wearing the orthotics he started with heel pain, now pain at worst is 7/10.          History obtained from: patient    Review of Systems   Constitutional:  Negative for chills and fever.   HENT:  Negative for ear pain and sore throat.    Eyes:   Negative for pain and visual disturbance.   Respiratory:  Negative for cough and shortness of breath.    Cardiovascular:  Negative for chest pain and palpitations.   Gastrointestinal:  Negative for abdominal pain and vomiting.   Genitourinary:  Negative for dysuria and hematuria.   Musculoskeletal:  Negative for arthralgias and back pain.        Foot pain   Skin:  Negative for color change and rash.   Neurological:  Negative for seizures and syncope.   All other systems reviewed and are negative.      Medical History Reviewed by provider this encounter:     .     Objective   There were no vitals taken for this visit.     Physical Exam  Vitals and nursing note reviewed.   Constitutional:       Appearance: Normal appearance. He is well-developed and normal weight.   HENT:      Head: Normocephalic and atraumatic.      Nose: Nose normal.      Mouth/Throat:      Mouth: Mucous membranes are moist.      Pharynx: Oropharynx is clear.     Eyes:      Conjunctiva/sclera: Conjunctivae normal.      Pupils: Pupils are equal, round, and reactive to light.       Cardiovascular:      Pulses:           Dorsalis pedis pulses are 2+ on the right side and 2+ on the left side.        Posterior tibial pulses are 2+ on the right side and 2+ on the left side.      Heart sounds: No murmur heard.  Pulmonary:      Effort: Pulmonary effort is normal. No respiratory distress.      Breath sounds: Normal breath sounds.   Abdominal:      Palpations: Abdomen is soft.      Tenderness: There is no abdominal tenderness.     Musculoskeletal:         General: No swelling.      Cervical back: Neck supple.      Right lower leg: No edema.      Left lower leg: No edema.      Right foot: Decreased range of motion.      Left foot: Decreased range of motion.   Feet:      Right foot:      Protective Sensation: 10 sites tested.  10 sites sensed.      Skin integrity: Skin integrity normal.      Toenail Condition: Right toenails are normal.      Left foot:       Protective Sensation: 10 sites tested.  10 sites sensed.      Skin integrity: Skin integrity normal.      Toenail Condition: Left toenails are normal.      Comments: Left heel with no   pain on palpation medial calcaneal tubercle, flexible pes cavus foot type with pronation ambulation, limited inversion and STJ, no signs of tarsal tunnel, equina varus deformity.            Skin:     General: Skin is warm and dry.      Capillary Refill: Capillary refill takes less than 2 seconds.     Neurological:      General: No focal deficit present.      Mental Status: He is alert and oriented to person, place, and time. Mental status is at baseline.     Psychiatric:         Mood and Affect: Mood normal.         Behavior: Behavior normal.         Thought Content: Thought content normal.         Judgment: Judgment normal.

## 2025-08-14 ENCOUNTER — OFFICE VISIT (OUTPATIENT)
Dept: PODIATRY | Facility: CLINIC | Age: 65
End: 2025-08-14
Payer: COMMERCIAL

## 2025-08-14 ENCOUNTER — APPOINTMENT (OUTPATIENT)
Dept: RADIOLOGY | Facility: CLINIC | Age: 65
End: 2025-08-14
Payer: COMMERCIAL

## 2025-08-19 ENCOUNTER — RESULTS FOLLOW-UP (OUTPATIENT)
Dept: PODIATRY | Facility: CLINIC | Age: 65
End: 2025-08-19

## 2025-08-19 ENCOUNTER — TELEPHONE (OUTPATIENT)
Dept: OBGYN CLINIC | Facility: CLINIC | Age: 65
End: 2025-08-19